# Patient Record
Sex: MALE | Race: BLACK OR AFRICAN AMERICAN | Employment: OTHER | URBAN - METROPOLITAN AREA
[De-identification: names, ages, dates, MRNs, and addresses within clinical notes are randomized per-mention and may not be internally consistent; named-entity substitution may affect disease eponyms.]

---

## 2018-06-27 ENCOUNTER — HOSPITAL ENCOUNTER (OUTPATIENT)
Dept: GENERAL RADIOLOGY | Facility: HOSPITAL | Age: 53
Discharge: HOME | End: 2018-06-27
Attending: PHYSICIAN ASSISTANT
Payer: MEDICAID

## 2018-06-27 DIAGNOSIS — M54.12 CERVICAL MYELOPATHY WITH CERVICAL RADICULOPATHY (HCC): ICD-10-CM

## 2018-06-27 DIAGNOSIS — G95.9 CERVICAL MYELOPATHY WITH CERVICAL RADICULOPATHY (HCC): ICD-10-CM

## 2018-06-27 PROCEDURE — 72040 X-RAY EXAM NECK SPINE 2-3 VW: CPT | Performed by: PHYSICIAN ASSISTANT

## 2018-07-05 ENCOUNTER — OFFICE VISIT (OUTPATIENT)
Dept: PHYSICAL THERAPY | Age: 53
End: 2018-07-05
Attending: ORTHOPAEDIC SURGERY
Payer: MEDICAID

## 2018-07-05 DIAGNOSIS — G95.9 CERVICAL MYELOPATHY (HCC): ICD-10-CM

## 2018-07-05 PROCEDURE — 97162 PT EVAL MOD COMPLEX 30 MIN: CPT | Performed by: PHYSICAL THERAPIST

## 2018-07-05 PROCEDURE — 97110 THERAPEUTIC EXERCISES: CPT | Performed by: PHYSICAL THERAPIST

## 2018-07-06 NOTE — PROGRESS NOTES
CERVICAL SPINE EVALUATION:   Referring Physician: Clovis Suárez MD  Diagnosis: Cervical myelopathy ([facility].) (G95.9) Date of Service: 7/5/2018   Date of Onset: 5/14/2018        SUBJECTIVE:   PATIENT SUMMARY:  Florentino East is a 46year old y/o male who pr impairments. Precautions: Fall Risk       OBJECTIVE:     Observation/Posture: Ambulatory with (B) axillary crutches with NBOS, leans on crutches with weight bearing on his armpits; stooped forward, slouched, kyphotic, forward head/down posture.   Reduced question please contact me at Dept: [phone].     Sincerely,  Electronically signed by therapist: Talisha Turner PT Cert MDT    Physician’s certification required: Yes  I certify the need for these services furnished under this plan of treatment and w

## 2018-07-06 NOTE — PATIENT INSTRUCTIONS
Sitting posture correction with lumbar roll. Photo 32-33 only 10 reps/2-3 hours. Use 1-2 pillows wit cervical to bias towards flexion initially; then perform above exercise 10 reps every 2-3 hours.

## 2018-07-10 ENCOUNTER — APPOINTMENT (OUTPATIENT)
Dept: PHYSICAL THERAPY | Age: 53
End: 2018-07-10
Attending: ORTHOPAEDIC SURGERY
Payer: MEDICAID

## 2018-07-11 ENCOUNTER — OFFICE VISIT (OUTPATIENT)
Dept: PHYSICAL THERAPY | Age: 53
End: 2018-07-11
Attending: ORTHOPAEDIC SURGERY
Payer: MEDICAID

## 2018-07-11 PROCEDURE — 97110 THERAPEUTIC EXERCISES: CPT | Performed by: PHYSICAL THERAPIST

## 2018-07-11 NOTE — PROGRESS NOTES
Date: 7/11/2018     Dx: Cervical myelopathy ([facility] (G95.9)         Authorized # of Visits:  13       Referring MD: Cherylene League Next MD visit: none scheduled    Medication Changes since last visit?: No    Subjective: Patient reports that he feels better in the ne basketball, [place], catch/throw football, watch TV/read) without compensation of deviation. 4. Patient to consistently have good posture to promote their symptom free condition.                 Plan:   Re-assess next session and continue with [facility]

## 2018-07-12 ENCOUNTER — APPOINTMENT (OUTPATIENT)
Dept: PHYSICAL THERAPY | Age: 53
End: 2018-07-12
Attending: ORTHOPAEDIC SURGERY
Payer: MEDICAID

## 2018-07-12 ENCOUNTER — APPOINTMENT (OUTPATIENT)
Dept: PHYSICAL THERAPY | Age: 53
End: 2018-07-12
Attending: INTERNAL MEDICINE
Payer: MEDICAID

## 2018-07-13 ENCOUNTER — OFFICE VISIT (OUTPATIENT)
Dept: PHYSICAL THERAPY | Age: 53
End: 2018-07-13
Attending: ORTHOPAEDIC SURGERY
Payer: MEDICAID

## 2018-07-13 PROCEDURE — 97110 THERAPEUTIC EXERCISES: CPT | Performed by: PHYSICAL THERAPIST

## 2018-07-13 NOTE — PROGRESS NOTES
Date: 7/13/2018     D[facility]: [facility] (G95.9)         Authorized # of Visits:  13       Referring MD: Temitope Staley  Next MD visit: none scheduled    Medication Changes since last visit?: No    Subjective: Patient states that his neck is doing good.   Justus Pate (B) UE greenTB n.row 10 reps x 10 cts ea    Seated: (B) UE greenTB w.row 8 reps x 10 cts    Seated: c/s extension x 10 reps                 Assessment:   Patient is progressing well with his neck exercises with added postural strengthening exercises.   But

## 2018-07-17 ENCOUNTER — APPOINTMENT (OUTPATIENT)
Dept: PHYSICAL THERAPY | Age: 53
End: 2018-07-17
Attending: ORTHOPAEDIC SURGERY
Payer: MEDICAID

## 2018-07-24 ENCOUNTER — APPOINTMENT (OUTPATIENT)
Dept: PHYSICAL THERAPY | Age: 53
End: 2018-07-24
Attending: ORTHOPAEDIC SURGERY
Payer: MEDICAID

## 2018-07-26 ENCOUNTER — APPOINTMENT (OUTPATIENT)
Dept: PHYSICAL THERAPY | Age: 53
End: 2018-07-26
Attending: ORTHOPAEDIC SURGERY
Payer: MEDICAID

## 2018-07-31 ENCOUNTER — APPOINTMENT (OUTPATIENT)
Dept: PHYSICAL THERAPY | Age: 53
End: 2018-07-31
Attending: ORTHOPAEDIC SURGERY
Payer: MEDICAID

## 2018-08-02 ENCOUNTER — APPOINTMENT (OUTPATIENT)
Dept: PHYSICAL THERAPY | Age: 53
End: 2018-08-02
Attending: ORTHOPAEDIC SURGERY
Payer: MEDICAID

## 2018-08-07 ENCOUNTER — APPOINTMENT (OUTPATIENT)
Dept: PHYSICAL THERAPY | Age: 53
End: 2018-08-07
Attending: ORTHOPAEDIC SURGERY
Payer: MEDICAID

## 2018-08-09 ENCOUNTER — APPOINTMENT (OUTPATIENT)
Dept: PHYSICAL THERAPY | Age: 53
End: 2018-08-09
Attending: ORTHOPAEDIC SURGERY
Payer: MEDICAID

## 2018-09-19 ENCOUNTER — HOSPITAL ENCOUNTER (OUTPATIENT)
Dept: GENERAL RADIOLOGY | Facility: HOSPITAL | Age: 53
Discharge: HOME | End: 2018-09-19
Attending: ORTHOPAEDIC SURGERY
Payer: MEDICAID

## 2018-09-19 DIAGNOSIS — G95.9 CERVICAL MYELOPATHY WITH CERVICAL RADICULOPATHY (HCC): ICD-10-CM

## 2018-09-19 DIAGNOSIS — M48.062 LUMBAR STENOSIS WITH NEUROGENIC CLAUDICATION: ICD-10-CM

## 2018-09-19 DIAGNOSIS — M54.12 CERVICAL MYELOPATHY WITH CERVICAL RADICULOPATHY (HCC): ICD-10-CM

## 2018-09-19 PROCEDURE — 72100 X-RAY EXAM L-S SPINE 2/3 VWS: CPT | Performed by: ORTHOPAEDIC SURGERY

## 2018-11-07 ENCOUNTER — HOSPITAL ENCOUNTER (OUTPATIENT)
Dept: GENERAL RADIOLOGY | Facility: HOSPITAL | Age: 53
Discharge: HOME | End: 2018-11-07
Attending: ORTHOPAEDIC SURGERY
Payer: MEDICAID

## 2018-11-07 DIAGNOSIS — M48.062 LUMBAR STENOSIS WITH NEUROGENIC CLAUDICATION: ICD-10-CM

## 2018-11-07 DIAGNOSIS — G95.9 CERVICAL MYELOPATHY WITH CERVICAL RADICULOPATHY (HCC): ICD-10-CM

## 2018-11-07 DIAGNOSIS — M54.12 CERVICAL MYELOPATHY WITH CERVICAL RADICULOPATHY (HCC): ICD-10-CM

## 2018-11-07 PROCEDURE — 72110 X-RAY EXAM L-2 SPINE 4/>VWS: CPT | Performed by: ORTHOPAEDIC SURGERY

## 2018-11-07 PROCEDURE — 72040 X-RAY EXAM NECK SPINE 2-3 VW: CPT | Performed by: ORTHOPAEDIC SURGERY

## 2018-12-05 ENCOUNTER — HOSPITAL ENCOUNTER (OUTPATIENT)
Dept: CT IMAGING | Facility: HOSPITAL | Age: 53
Discharge: HOME | End: 2018-12-05
Attending: ORTHOPAEDIC SURGERY
Payer: MEDICAID

## 2018-12-05 DIAGNOSIS — M48.062 LUMBAR STENOSIS WITH NEUROGENIC CLAUDICATION: ICD-10-CM

## 2018-12-05 PROCEDURE — 72131 CT LUMBAR SPINE W/O DYE: CPT | Performed by: ORTHOPAEDIC SURGERY

## 2019-01-03 ENCOUNTER — HOSPITAL ENCOUNTER (OUTPATIENT)
Dept: MRI IMAGING | Facility: HOSPITAL | Age: 54
Discharge: HOME | End: 2019-01-03
Attending: PHYSICIAN ASSISTANT
Payer: MEDICAID

## 2019-01-03 DIAGNOSIS — M48.062 LUMBAR STENOSIS WITH NEUROGENIC CLAUDICATION: ICD-10-CM

## 2019-01-03 LAB — CREAT BLD-MCNC: 1 MG/DL (ref 0.5–1.5)

## 2019-01-03 PROCEDURE — A9575 INJ GADOTERATE MEGLUMI 0.1ML: HCPCS | Performed by: PHYSICIAN ASSISTANT

## 2019-01-03 PROCEDURE — 72158 MRI LUMBAR SPINE W/O & W/DYE: CPT | Performed by: PHYSICIAN ASSISTANT

## 2019-01-03 PROCEDURE — 82565 ASSAY OF CREATININE: CPT

## 2019-02-14 ENCOUNTER — PROCEDURE VISIT (OUTPATIENT)
Dept: NEUROLOGY | Facility: CLINIC | Age: 54
End: 2019-02-14
Payer: MEDICAID

## 2019-02-14 DIAGNOSIS — R20.2 NUMBNESS AND TINGLING: Primary | ICD-10-CM

## 2019-02-14 DIAGNOSIS — M48.062 LUMBAR STENOSIS WITH NEUROGENIC CLAUDICATION: ICD-10-CM

## 2019-02-14 DIAGNOSIS — R20.0 NUMBNESS AND TINGLING: Primary | ICD-10-CM

## 2019-02-14 DIAGNOSIS — G95.9 CERVICAL MYELOPATHY (HCC): ICD-10-CM

## 2019-02-14 PROCEDURE — 95908 NRV CNDJ TST 3-4 STUDIES: CPT | Performed by: PHYSICAL MEDICINE & REHABILITATION

## 2019-02-14 PROCEDURE — 95886 MUSC TEST DONE W/N TEST COMP: CPT | Performed by: PHYSICAL MEDICINE & REHABILITATION

## 2019-02-14 NOTE — PROGRESS NOTES
[address]  Electromyography Consultation      History of Present Illness:    Dear Dr. Criss Lawrence,  Thank you for the opportunity to see Toby Gardiner for electrodiagnostic consultation today.  As you know the patie Strength: Lower extremities are diffusely weak  Muscle bulk: No atrophy  Sensation: Decreased in a stocking distribution bilaterally  Reflexes: Bilateral lower extremity spasticity and clonus of the right ankle.   SLR: Negative      EMG/NCV  Sensory NCS Findings: Extremities were warmed with hot packs for 15 minutes prior to testing. Sensory nerve conduction studies revealed bilaterally absent sural sensory responses.   Motor nerve conduction studies revealed small amplitude common peroneal motor response Has 5 beats of clonus of the right ankle and spasticity of the LEs, he may also benefit from baclofen. Thank you for the opportunity to participate in the care of this patient. Sincerely,    Luciana London M.D.   Diplomate [employer]

## 2019-02-25 ENCOUNTER — TELEPHONE (OUTPATIENT)
Dept: NEUROLOGY | Facility: CLINIC | Age: 54
End: 2019-02-25

## 2019-03-18 ENCOUNTER — HOSPITAL ENCOUNTER (OUTPATIENT)
Dept: GENERAL RADIOLOGY | Facility: HOSPITAL | Age: 54
Discharge: HOME | End: 2019-03-18
Attending: PHYSICIAN ASSISTANT
Payer: MEDICAID

## 2019-03-18 DIAGNOSIS — Z98.1 ARTHRODESIS PRESENT: ICD-10-CM

## 2019-03-18 PROCEDURE — 72100 X-RAY EXAM L-S SPINE 2/3 VWS: CPT | Performed by: PHYSICIAN ASSISTANT

## 2019-04-03 ENCOUNTER — HOSPITAL ENCOUNTER (OUTPATIENT)
Dept: GENERAL RADIOLOGY | Facility: HOSPITAL | Age: 54
Discharge: HOME | End: 2019-04-03
Attending: PHYSICIAN ASSISTANT
Payer: MEDICAID

## 2019-04-03 DIAGNOSIS — G95.9 MYELOPATHY OF CERVICAL SPINAL CORD WITH CERVICAL RADICULOPATHY (HCC): ICD-10-CM

## 2019-04-03 DIAGNOSIS — M54.12 MYELOPATHY OF CERVICAL SPINAL CORD WITH CERVICAL RADICULOPATHY (HCC): ICD-10-CM

## 2019-04-03 PROCEDURE — 72050 X-RAY EXAM NECK SPINE 4/5VWS: CPT | Performed by: PHYSICIAN ASSISTANT

## 2019-08-21 ENCOUNTER — HOSPITAL ENCOUNTER (OUTPATIENT)
Dept: GENERAL RADIOLOGY | Facility: HOSPITAL | Age: 54
Discharge: HOME | End: 2019-08-21
Attending: ORTHOPAEDIC SURGERY
Payer: MEDICAID

## 2019-08-21 VITALS — HEIGHT: 67 IN | BODY MASS INDEX: 35.94 KG/M2 | WEIGHT: 229 LBS

## 2019-08-21 DIAGNOSIS — Z98.1 STATUS POST LUMBAR SPINAL FUSION: ICD-10-CM

## 2019-08-21 PROCEDURE — 72100 X-RAY EXAM L-S SPINE 2/3 VWS: CPT | Performed by: ORTHOPAEDIC SURGERY

## 2019-08-21 RX ORDER — FUROSEMIDE 20 MG/1
20 TABLET ORAL DAILY
COMMUNITY

## 2019-08-21 RX ORDER — AMLODIPINE BESYLATE 10 MG/1
10 TABLET ORAL DAILY
COMMUNITY

## 2019-08-21 RX ORDER — LISINOPRIL 40 MG/1
40 TABLET ORAL DAILY
COMMUNITY

## 2019-08-26 ENCOUNTER — HOSPITAL ENCOUNTER (OUTPATIENT)
Dept: CT IMAGING | Facility: HOSPITAL | Age: 54
Discharge: HOME | End: 2019-08-26
Attending: ORTHOPAEDIC SURGERY
Payer: MEDICAID

## 2019-08-26 ENCOUNTER — HOSPITAL ENCOUNTER (OUTPATIENT)
Dept: GENERAL RADIOLOGY | Facility: HOSPITAL | Age: 54
Discharge: HOME | End: 2019-08-26
Attending: ORTHOPAEDIC SURGERY
Payer: MEDICAID

## 2019-08-26 VITALS
DIASTOLIC BLOOD PRESSURE: 85 MMHG | OXYGEN SATURATION: 95 % | TEMPERATURE: 98 F | HEART RATE: 54 BPM | SYSTOLIC BLOOD PRESSURE: 132 MMHG | RESPIRATION RATE: 16 BRPM

## 2019-08-26 DIAGNOSIS — Z98.1 S/P LUMBAR FUSION: ICD-10-CM

## 2019-08-26 PROCEDURE — 72132 CT LUMBAR SPINE W/DYE: CPT | Performed by: ORTHOPAEDIC SURGERY

## 2019-08-26 PROCEDURE — 62304 MYELOGRAPHY LUMBAR INJECTION: CPT | Performed by: ORTHOPAEDIC SURGERY

## 2019-08-26 NOTE — IMAGING NOTE
Received patient post myelogram Rad holding. Friend present to drive. Reviewed discharge with patient. AVS sheet provided. Juice and crackers provided. Vital signs q 15 min- see flow sheet. Pain present about 3/4 of 10. @ 1205     1305.  All questions revie

## 2020-01-15 ENCOUNTER — HOSPITAL ENCOUNTER (OUTPATIENT)
Dept: GENERAL RADIOLOGY | Facility: HOSPITAL | Age: 55
Discharge: HOME | End: 2020-01-15
Attending: ORTHOPAEDIC SURGERY
Payer: MEDICAID

## 2020-01-15 DIAGNOSIS — Z98.1 S/P LUMBAR FUSION: ICD-10-CM

## 2020-01-15 PROCEDURE — 72100 X-RAY EXAM L-S SPINE 2/3 VWS: CPT | Performed by: ORTHOPAEDIC SURGERY

## 2020-08-19 ENCOUNTER — HOSPITAL ENCOUNTER (OUTPATIENT)
Dept: GENERAL RADIOLOGY | Facility: HOSPITAL | Age: 55
Discharge: HOME | End: 2020-08-19
Attending: ORTHOPAEDIC SURGERY
Payer: MEDICAID

## 2020-08-19 DIAGNOSIS — Z98.1 S/P LUMBAR FUSION: ICD-10-CM

## 2020-08-19 PROCEDURE — 72100 X-RAY EXAM L-S SPINE 2/3 VWS: CPT | Performed by: ORTHOPAEDIC SURGERY

## 2020-10-07 ENCOUNTER — HOSPITAL ENCOUNTER (OUTPATIENT)
Dept: GENERAL RADIOLOGY | Facility: HOSPITAL | Age: 55
Discharge: HOME | End: 2020-10-07
Attending: ORTHOPAEDIC SURGERY
Payer: MEDICAID

## 2020-10-07 DIAGNOSIS — Z98.1 S/P LUMBAR SPINAL FUSION: ICD-10-CM

## 2020-10-07 PROCEDURE — 72100 X-RAY EXAM L-S SPINE 2/3 VWS: CPT | Performed by: ORTHOPAEDIC SURGERY

## 2022-02-28 NOTE — IMAGING NOTE
Called and spoke with pt regarding instructions for scheduled lumbar myelogram on Monday, 8/26/19. Allergies/meds reviewed. 99

## (undated) NOTE — LETTER
[address] [place] [address]  Authorization for Invasive Procedures  1.  I hereby authorize Dr. Jewel Sandoval , my physician and whomever may be designated as the doctor's assistant, to perform the following operation and/or procedure: performed for the purposes of advancing medicine, science, and/or education, provided my identity is not revealed. If the procedure has been videotaped, the physician/surgeon will obtain the original videotape.  The hospital will not be responsible for stor My signature below affirms that prior to the time of the procedure, I have explained to the patient and/or his legal representative, the risks and benefits involved in the proposed treatment and any reasonable alternative to the proposed treatment.  I have